# Patient Record
Sex: MALE | Race: WHITE | NOT HISPANIC OR LATINO
[De-identification: names, ages, dates, MRNs, and addresses within clinical notes are randomized per-mention and may not be internally consistent; named-entity substitution may affect disease eponyms.]

---

## 2017-05-17 ENCOUNTER — APPOINTMENT (OUTPATIENT)
Dept: OTOLARYNGOLOGY | Facility: CLINIC | Age: 72
End: 2017-05-17

## 2017-05-17 VITALS
TEMPERATURE: 98.4 F | OXYGEN SATURATION: 96 % | DIASTOLIC BLOOD PRESSURE: 81 MMHG | SYSTOLIC BLOOD PRESSURE: 151 MMHG | BODY MASS INDEX: 25.68 KG/M2 | HEART RATE: 67 BPM | HEIGHT: 73.5 IN | WEIGHT: 198 LBS

## 2017-05-17 VITALS — SYSTOLIC BLOOD PRESSURE: 134 MMHG | DIASTOLIC BLOOD PRESSURE: 83 MMHG

## 2017-05-17 DIAGNOSIS — H92.09 OTALGIA, UNSPECIFIED EAR: ICD-10-CM

## 2017-05-18 PROBLEM — H92.09 EAR ACHE: Status: ACTIVE | Noted: 2017-05-17

## 2017-05-24 ENCOUNTER — APPOINTMENT (OUTPATIENT)
Dept: OTOLARYNGOLOGY | Facility: CLINIC | Age: 72
End: 2017-05-24

## 2017-05-24 VITALS
DIASTOLIC BLOOD PRESSURE: 84 MMHG | TEMPERATURE: 97.8 F | SYSTOLIC BLOOD PRESSURE: 162 MMHG | OXYGEN SATURATION: 100 % | HEART RATE: 63 BPM

## 2017-05-24 DIAGNOSIS — H61.23 IMPACTED CERUMEN, BILATERAL: ICD-10-CM

## 2020-07-23 ENCOUNTER — APPOINTMENT (OUTPATIENT)
Dept: OTOLARYNGOLOGY | Facility: CLINIC | Age: 75
End: 2020-07-23
Payer: MEDICARE

## 2020-07-23 VITALS
BODY MASS INDEX: 25.84 KG/M2 | DIASTOLIC BLOOD PRESSURE: 84 MMHG | WEIGHT: 195 LBS | OXYGEN SATURATION: 99 % | HEART RATE: 72 BPM | SYSTOLIC BLOOD PRESSURE: 151 MMHG | TEMPERATURE: 97.8 F | HEIGHT: 73 IN

## 2020-07-23 DIAGNOSIS — Z86.79 PERSONAL HISTORY OF OTHER DISEASES OF THE CIRCULATORY SYSTEM: ICD-10-CM

## 2020-07-23 DIAGNOSIS — H61.23 IMPACTED CERUMEN, BILATERAL: ICD-10-CM

## 2020-07-23 PROCEDURE — 99202 OFFICE O/P NEW SF 15 MIN: CPT

## 2020-07-23 RX ORDER — AMLODIPINE BESYLATE 5 MG/1
TABLET ORAL
Refills: 0 | Status: ACTIVE | COMMUNITY

## 2020-07-23 NOTE — PHYSICAL EXAM
[de-identified] : b copious cerumen impaction removed atraumatically with suction [Normal] : no neck adenopathy

## 2020-07-23 NOTE — HISTORY OF PRESENT ILLNESS
[de-identified] : followup 76 yo man with b recurrent cerumen impactions, here for removal. He reports that b ears feel full. He does not use qtips but has been using debrox to soften it for a few days. Denies pain or drainage. feels his hl is moderate bilaterally. no fh hl at a young age. nonsmoker.

## 2021-06-21 ENCOUNTER — NON-APPOINTMENT (OUTPATIENT)
Age: 76
End: 2021-06-21

## 2021-06-23 ENCOUNTER — APPOINTMENT (OUTPATIENT)
Dept: OTOLARYNGOLOGY | Facility: CLINIC | Age: 76
End: 2021-06-23
Payer: MEDICARE

## 2021-06-23 VITALS
WEIGHT: 200 LBS | OXYGEN SATURATION: 97 % | RESPIRATION RATE: 14 BRPM | TEMPERATURE: 97.4 F | HEART RATE: 60 BPM | HEIGHT: 73 IN | SYSTOLIC BLOOD PRESSURE: 130 MMHG | BODY MASS INDEX: 26.51 KG/M2 | DIASTOLIC BLOOD PRESSURE: 66 MMHG

## 2021-06-23 DIAGNOSIS — H61.23 IMPACTED CERUMEN, BILATERAL: ICD-10-CM

## 2021-06-23 PROCEDURE — 99072 ADDL SUPL MATRL&STAF TM PHE: CPT

## 2021-06-23 PROCEDURE — 99212 OFFICE O/P EST SF 10 MIN: CPT

## 2021-06-23 NOTE — HISTORY OF PRESENT ILLNESS
[de-identified] : 75 yo man with h.o b recurrent cerumen impaction - he feels b ears have  been impacted with hl for about 2 weeks. He has been using debrox to soften the wax. here for removal.

## 2021-06-23 NOTE — PHYSICAL EXAM
[Normal] : tympanic membranes are normal in both ears [de-identified] : b copious cerumen impaction removed atraumatically with suction

## 2023-06-12 ENCOUNTER — OFFICE (OUTPATIENT)
Dept: URBAN - METROPOLITAN AREA CLINIC 28 | Facility: CLINIC | Age: 78
Setting detail: OPHTHALMOLOGY
End: 2023-06-12
Payer: COMMERCIAL

## 2023-06-12 DIAGNOSIS — H25.13: ICD-10-CM

## 2023-06-12 DIAGNOSIS — H25.11: ICD-10-CM

## 2023-06-12 DIAGNOSIS — H44.23: ICD-10-CM

## 2023-06-12 DIAGNOSIS — H43.393: ICD-10-CM

## 2023-06-12 PROBLEM — H25.12 CATARACT SENILE NUCLEAR SCLEROSIS; RIGHT EYE, LEFT EYE, BOTH EYES: Status: ACTIVE | Noted: 2023-06-12

## 2023-06-12 PROCEDURE — 92202 OPSCPY EXTND ON/MAC DRAW: CPT | Performed by: OPHTHALMOLOGY

## 2023-06-12 PROCEDURE — 92136 OPHTHALMIC BIOMETRY: CPT | Performed by: OPHTHALMOLOGY

## 2023-06-12 PROCEDURE — 92134 CPTRZ OPH DX IMG PST SGM RTA: CPT | Performed by: OPHTHALMOLOGY

## 2023-06-12 PROCEDURE — 92004 COMPRE OPH EXAM NEW PT 1/>: CPT | Performed by: OPHTHALMOLOGY

## 2023-06-12 ASSESSMENT — KERATOMETRY
OS_K2POWER_DIOPTERS: 45.00
OD_K2POWER_DIOPTERS: 44.75
OS_AXISANGLE_DEGREES: 106
OD_AXISANGLE_DEGREES: 76
OD_CYLAXISANGLE_DEGREES: 166
OD_K1K2_AVERAGE: 44.25
OS_AXISANGLE2_DEGREES: 016
OS_CYLAXISANGLE_DEGREES: 016
OS_K1K2_AVERAGE: 44.5
OD_AXISANGLE2_DEGREES: 166
OS_CYLPOWER_DEGREES: 1
OD_K1POWER_DIOPTERS: 43.75
OS_K1POWER_DIOPTERS: 44.00
OD_CYLPOWER_DEGREES: 1

## 2023-06-12 ASSESSMENT — LID EXAM ASSESSMENTS
OD_MEIBOMITIS: 1+ 2+
OS_MEIBOMITIS: 1+ 2+

## 2023-06-12 ASSESSMENT — CONFRONTATIONAL VISUAL FIELD TEST (CVF)
OS_FINDINGS: FULL
OD_FINDINGS: FULL

## 2023-06-12 ASSESSMENT — TONOMETRY
OD_IOP_MMHG: 12
OS_IOP_MMHG: 14

## 2023-06-13 ASSESSMENT — KERATOMETRY
OD_K1POWER_DIOPTERS: 43.75
OS_K2POWER_DIOPTERS: 45.00
OS_AXISANGLE_DEGREES: 016
OD_AXISANGLE_DEGREES: 166
OS_K1POWER_DIOPTERS: 44.00
OD_K2POWER_DIOPTERS: 44.75

## 2023-06-13 ASSESSMENT — REFRACTION_AUTOREFRACTION
OS_CYLINDER: -3.50
OS_SPHERE: -17.50
OD_SPHERE: -14.25
OD_CYLINDER: -2.50
OS_AXIS: 114
OD_AXIS: 082

## 2023-06-13 ASSESSMENT — REFRACTION_CURRENTRX
OS_AXIS: 118
OD_SPHERE: -12.25
OS_OVR_VA: 20/
OS_CYLINDER: -1.00
OD_AXIS: 091
OD_OVR_VA: 20/
OD_CYLINDER: -4.00
OS_SPHERE: -19.00

## 2023-06-13 ASSESSMENT — SPHEQUIV_DERIVED
OD_SPHEQUIV: -15.5
OS_SPHEQUIV: -19.25

## 2023-06-13 ASSESSMENT — VISUAL ACUITY
OS_BCVA: 20/60
OD_BCVA: 20/50-1

## 2023-06-13 ASSESSMENT — AXIALLENGTH_DERIVED
OS_AL: 33.82
OD_AL: 31.22

## 2023-07-06 ENCOUNTER — RX ONLY (RX ONLY)
Age: 78
End: 2023-07-06

## 2023-07-11 ENCOUNTER — AMBULATORY SURGERY CENTER (OUTPATIENT)
Dept: URBAN - METROPOLITAN AREA CLINIC 75 | Facility: CLINIC | Age: 78
Setting detail: OPHTHALMOLOGY
End: 2023-07-11
Payer: COMMERCIAL

## 2023-07-11 DIAGNOSIS — H25.11: ICD-10-CM

## 2023-07-11 DIAGNOSIS — H52.211: ICD-10-CM

## 2023-07-11 PROCEDURE — FEMTO FEMTOSECOND LASER: Performed by: OPHTHALMOLOGY

## 2023-07-11 PROCEDURE — 66984 XCAPSL CTRC RMVL W/O ECP: CPT | Performed by: OPHTHALMOLOGY

## 2023-07-12 ENCOUNTER — OFFICE (OUTPATIENT)
Dept: URBAN - METROPOLITAN AREA CLINIC 28 | Facility: CLINIC | Age: 78
Setting detail: OPHTHALMOLOGY
End: 2023-07-12
Payer: COMMERCIAL

## 2023-07-12 DIAGNOSIS — Z96.1: ICD-10-CM

## 2023-07-12 PROCEDURE — 99024 POSTOP FOLLOW-UP VISIT: CPT | Performed by: OPHTHALMOLOGY

## 2023-07-12 ASSESSMENT — CORNEAL EDEMA - MICROCYSTIC EPITHELIAL EDEMA (MCE): OD_MCE: T

## 2023-07-12 ASSESSMENT — CONFRONTATIONAL VISUAL FIELD TEST (CVF)
OS_FINDINGS: FULL
OD_FINDINGS: FULL

## 2023-07-12 ASSESSMENT — LID EXAM ASSESSMENTS
OD_MEIBOMITIS: 1+ 2+
OS_MEIBOMITIS: 1+ 2+

## 2023-07-12 ASSESSMENT — TONOMETRY: OD_IOP_MMHG: 12

## 2023-07-13 ASSESSMENT — REFRACTION_CURRENTRX
OD_AXIS: 091
OS_AXIS: 118
OS_CYLINDER: -1.00
OD_OVR_VA: 20/
OD_SPHERE: -12.25
OS_SPHERE: -19.00
OS_OVR_VA: 20/
OD_CYLINDER: -4.00

## 2023-07-13 ASSESSMENT — REFRACTION_AUTOREFRACTION
OS_SPHERE: -17.50
OD_AXIS: 082
OD_SPHERE: -14.25
OS_CYLINDER: -3.50
OD_CYLINDER: -2.50
OS_AXIS: 114

## 2023-07-13 ASSESSMENT — AXIALLENGTH_DERIVED
OD_AL: 31.22
OS_AL: 33.82

## 2023-07-13 ASSESSMENT — KERATOMETRY
OD_K2POWER_DIOPTERS: 44.75
OS_AXISANGLE_DEGREES: 016
OS_K1POWER_DIOPTERS: 44.00
OS_K2POWER_DIOPTERS: 45.00
OD_K1POWER_DIOPTERS: 43.75
OD_AXISANGLE_DEGREES: 166

## 2023-07-13 ASSESSMENT — SPHEQUIV_DERIVED
OD_SPHEQUIV: -15.5
OS_SPHEQUIV: -19.25

## 2023-07-13 ASSESSMENT — VISUAL ACUITY
OD_BCVA: 20/50-1
OS_BCVA: 20/40

## 2023-07-20 ENCOUNTER — OFFICE (OUTPATIENT)
Dept: URBAN - METROPOLITAN AREA CLINIC 28 | Facility: CLINIC | Age: 78
Setting detail: OPHTHALMOLOGY
End: 2023-07-20
Payer: COMMERCIAL

## 2023-07-20 DIAGNOSIS — H25.12: ICD-10-CM

## 2023-07-20 DIAGNOSIS — Z96.1: ICD-10-CM

## 2023-07-20 PROCEDURE — 99024 POSTOP FOLLOW-UP VISIT: CPT | Performed by: OPHTHALMOLOGY

## 2023-07-20 ASSESSMENT — REFRACTION_CURRENTRX
OD_CYLINDER: -4.00
OS_OVR_VA: 20/
OD_AXIS: 091
OS_CYLINDER: -1.00
OD_SPHERE: -12.25
OD_OVR_VA: 20/
OS_AXIS: 118
OS_SPHERE: -19.00

## 2023-07-20 ASSESSMENT — KERATOMETRY
OD_AXISANGLE_DEGREES: 166
OS_K2POWER_DIOPTERS: 45.00
OS_K1POWER_DIOPTERS: 44.00
OD_K2POWER_DIOPTERS: 44.75
OS_AXISANGLE_DEGREES: 016
OD_K1POWER_DIOPTERS: 43.75

## 2023-07-20 ASSESSMENT — REFRACTION_AUTOREFRACTION
OS_CYLINDER: -3.50
OS_AXIS: 114
OD_SPHERE: -2.25
OS_SPHERE: -17.50
OD_AXIS: 065
OD_CYLINDER: -1.50

## 2023-07-20 ASSESSMENT — SPHEQUIV_DERIVED
OD_SPHEQUIV: -3
OS_SPHEQUIV: -19.25
OD_SPHEQUIV: -2.5

## 2023-07-20 ASSESSMENT — REFRACTION_MANIFEST
OD_CYLINDER: -1.00
OD_VA1: 20/20-2
OD_AXIS: 065
OD_SPHERE: -2.00

## 2023-07-20 ASSESSMENT — LID EXAM ASSESSMENTS
OS_MEIBOMITIS: 1+ 2+
OD_MEIBOMITIS: 1+ 2+

## 2023-07-20 ASSESSMENT — CONFRONTATIONAL VISUAL FIELD TEST (CVF)
OS_FINDINGS: FULL
OD_FINDINGS: FULL

## 2023-07-20 ASSESSMENT — VISUAL ACUITY
OD_BCVA: 20/50-1
OS_BCVA: 20/40-2

## 2023-07-20 ASSESSMENT — AXIALLENGTH_DERIVED
OD_AL: 24.5204
OS_AL: 33.82
OD_AL: 24.3117

## 2023-07-20 ASSESSMENT — CORNEAL EDEMA - MICROCYSTIC EPITHELIAL EDEMA (MCE): OD_MCE: ABSENT

## 2023-07-20 ASSESSMENT — TONOMETRY: OD_IOP_MMHG: 13

## 2023-07-21 ENCOUNTER — RX ONLY (RX ONLY)
Age: 78
End: 2023-07-21

## 2023-07-25 ENCOUNTER — AMBULATORY SURGERY CENTER (OUTPATIENT)
Dept: URBAN - METROPOLITAN AREA CLINIC 75 | Facility: CLINIC | Age: 78
Setting detail: OPHTHALMOLOGY
End: 2023-07-25
Payer: COMMERCIAL

## 2023-07-25 DIAGNOSIS — H52.212: ICD-10-CM

## 2023-07-25 DIAGNOSIS — H25.12: ICD-10-CM

## 2023-07-25 PROCEDURE — FEMTO FEMTOSECOND LASER: Performed by: OPHTHALMOLOGY

## 2023-07-25 PROCEDURE — 66984 XCAPSL CTRC RMVL W/O ECP: CPT | Performed by: OPHTHALMOLOGY

## 2023-07-26 ENCOUNTER — OFFICE (OUTPATIENT)
Dept: URBAN - METROPOLITAN AREA CLINIC 28 | Facility: CLINIC | Age: 78
Setting detail: OPHTHALMOLOGY
End: 2023-07-26
Payer: COMMERCIAL

## 2023-07-26 DIAGNOSIS — H16.222: ICD-10-CM

## 2023-07-26 DIAGNOSIS — Z96.1: ICD-10-CM

## 2023-07-26 PROBLEM — H25.12 CATARACT SENILE NUCLEAR SCLEROSIS; LEFT EYE: Status: RESOLVED | Noted: 2023-07-12 | Resolved: 2023-07-26

## 2023-07-26 PROCEDURE — 99024 POSTOP FOLLOW-UP VISIT: CPT | Performed by: OPHTHALMOLOGY

## 2023-07-26 ASSESSMENT — KERATOMETRY
OS_K1POWER_DIOPTERS: 44.00
OD_K1POWER_DIOPTERS: 43.75
OS_K2POWER_DIOPTERS: 45.00
OS_AXISANGLE_DEGREES: 016
OD_K2POWER_DIOPTERS: 44.75
OD_AXISANGLE_DEGREES: 166

## 2023-07-26 ASSESSMENT — SPHEQUIV_DERIVED
OD_SPHEQUIV: -2.5
OD_SPHEQUIV: -3
OS_SPHEQUIV: -19.25

## 2023-07-26 ASSESSMENT — REFRACTION_AUTOREFRACTION
OD_AXIS: 065
OS_AXIS: 114
OD_SPHERE: -2.25
OD_CYLINDER: -1.50
OS_CYLINDER: -3.50
OS_SPHERE: -17.50

## 2023-07-26 ASSESSMENT — TONOMETRY
OD_IOP_MMHG: 12
OS_IOP_MMHG: 12

## 2023-07-26 ASSESSMENT — AXIALLENGTH_DERIVED
OD_AL: 24.5204
OD_AL: 24.3117
OS_AL: 33.82

## 2023-07-26 ASSESSMENT — REFRACTION_CURRENTRX
OD_AXIS: 091
OS_SPHERE: -19.00
OS_CYLINDER: -1.00
OD_SPHERE: -12.25
OD_CYLINDER: -4.00
OD_OVR_VA: 20/
OS_AXIS: 118
OS_OVR_VA: 20/

## 2023-07-26 ASSESSMENT — VISUAL ACUITY
OD_BCVA: 20/40
OS_BCVA: 20/40-2

## 2023-07-26 ASSESSMENT — REFRACTION_MANIFEST
OD_AXIS: 065
OD_VA1: 20/20-2
OD_CYLINDER: -1.00
OD_SPHERE: -2.00

## 2023-07-26 ASSESSMENT — SUPERFICIAL PUNCTATE KERATITIS (SPK): OS_SPK: 1+

## 2023-07-26 ASSESSMENT — LID EXAM ASSESSMENTS
OD_MEIBOMITIS: 1+ 2+
OS_MEIBOMITIS: 1+ 2+

## 2023-07-26 ASSESSMENT — CONFRONTATIONAL VISUAL FIELD TEST (CVF)
OD_FINDINGS: FULL
OS_FINDINGS: FULL

## 2023-08-09 ENCOUNTER — OFFICE (OUTPATIENT)
Dept: URBAN - METROPOLITAN AREA CLINIC 28 | Facility: CLINIC | Age: 78
Setting detail: OPHTHALMOLOGY
End: 2023-08-09
Payer: COMMERCIAL

## 2023-08-09 DIAGNOSIS — H16.222: ICD-10-CM

## 2023-08-09 DIAGNOSIS — Z96.1: ICD-10-CM

## 2023-08-09 PROCEDURE — 99024 POSTOP FOLLOW-UP VISIT: CPT | Performed by: OPHTHALMOLOGY

## 2023-08-09 ASSESSMENT — REFRACTION_CURRENTRX
OD_AXIS: 091
OS_CYLINDER: -1.00
OS_OVR_VA: 20/
OD_SPHERE: -12.25
OS_AXIS: 118
OD_OVR_VA: 20/
OS_SPHERE: -19.00
OD_CYLINDER: -4.00

## 2023-08-09 ASSESSMENT — VISUAL ACUITY
OD_BCVA: 20/40
OS_BCVA: 20/70

## 2023-08-09 ASSESSMENT — KERATOMETRY
OD_K2POWER_DIOPTERS: 44.25
OS_AXISANGLE_DEGREES: 006
OS_K2POWER_DIOPTERS: 44.50
OS_K1POWER_DIOPTERS: 43.75
OD_K1POWER_DIOPTERS: 43.50
OD_AXISANGLE_DEGREES: 154

## 2023-08-09 ASSESSMENT — AXIALLENGTH_DERIVED
OS_AL: 24.3088
OD_AL: 24.4589
OD_AL: 24.617

## 2023-08-09 ASSESSMENT — REFRACTION_MANIFEST
OD_VA1: 20/20-2
OD_AXIS: 065
OD_CYLINDER: -1.00
OD_SPHERE: -2.00

## 2023-08-09 ASSESSMENT — REFRACTION_AUTOREFRACTION
OS_SPHERE: -1.75
OD_CYLINDER: -0.75
OS_AXIS: 095
OS_CYLINDER: -1.25
OD_AXIS: 061
OD_SPHERE: -2.50

## 2023-08-09 ASSESSMENT — SUPERFICIAL PUNCTATE KERATITIS (SPK): OS_SPK: ABSENT

## 2023-08-09 ASSESSMENT — CONFRONTATIONAL VISUAL FIELD TEST (CVF)
OD_FINDINGS: FULL
OS_FINDINGS: FULL

## 2023-08-09 ASSESSMENT — SPHEQUIV_DERIVED
OD_SPHEQUIV: -2.5
OD_SPHEQUIV: -2.875
OS_SPHEQUIV: -2.375

## 2023-08-09 ASSESSMENT — TONOMETRY
OS_IOP_MMHG: 14
OD_IOP_MMHG: 14

## 2023-08-09 ASSESSMENT — LID EXAM ASSESSMENTS
OS_MEIBOMITIS: 1+ 2+
OD_MEIBOMITIS: 1+ 2+

## 2023-09-06 ENCOUNTER — OFFICE (OUTPATIENT)
Dept: URBAN - METROPOLITAN AREA CLINIC 28 | Facility: CLINIC | Age: 78
Setting detail: OPHTHALMOLOGY
End: 2023-09-06
Payer: COMMERCIAL

## 2023-09-06 DIAGNOSIS — H16.222: ICD-10-CM

## 2023-09-06 DIAGNOSIS — Z96.1: ICD-10-CM

## 2023-09-06 PROCEDURE — 99024 POSTOP FOLLOW-UP VISIT: CPT | Performed by: OPHTHALMOLOGY

## 2023-09-06 ASSESSMENT — LID EXAM ASSESSMENTS
OS_MEIBOMITIS: 1+ 2+
OD_MEIBOMITIS: 1+ 2+

## 2023-09-06 ASSESSMENT — CONFRONTATIONAL VISUAL FIELD TEST (CVF)
OD_FINDINGS: FULL
OS_FINDINGS: FULL

## 2023-09-07 ASSESSMENT — REFRACTION_MANIFEST
OD_SPHERE: -2.00
OD_VA1: 20/20-2
OD_CYLINDER: -1.00
OD_AXIS: 065

## 2023-09-07 ASSESSMENT — REFRACTION_CURRENTRX
OD_AXIS: 091
OS_AXIS: 118
OS_CYLINDER: -1.00
OD_CYLINDER: -4.00
OS_OVR_VA: 20/
OD_SPHERE: -12.25
OD_OVR_VA: 20/
OS_SPHERE: -19.00

## 2023-09-07 ASSESSMENT — REFRACTION_AUTOREFRACTION
OD_SPHERE: -2.00
OS_CYLINDER: -1.25
OS_SPHERE: -1.75
OD_AXIS: 083
OS_AXIS: 097
OD_CYLINDER: -1.00

## 2023-09-07 ASSESSMENT — AXIALLENGTH_DERIVED
OS_AL: 24.2601
OD_AL: 24.3606
OD_AL: 24.3606

## 2023-09-07 ASSESSMENT — SPHEQUIV_DERIVED
OD_SPHEQUIV: -2.5
OD_SPHEQUIV: -2.5
OS_SPHEQUIV: -2.375

## 2023-09-07 ASSESSMENT — KERATOMETRY
OD_AXISANGLE_DEGREES: 160
OD_K1POWER_DIOPTERS: 43.75
OS_K1POWER_DIOPTERS: 43.75
OD_K2POWER_DIOPTERS: 44.50
OS_K2POWER_DIOPTERS: 44.75
OS_AXISANGLE_DEGREES: 004

## 2023-09-07 ASSESSMENT — VISUAL ACUITY
OS_BCVA: 20/50+2
OD_BCVA: 20/40+1